# Patient Record
Sex: FEMALE | Race: WHITE | URBAN - METROPOLITAN AREA
[De-identification: names, ages, dates, MRNs, and addresses within clinical notes are randomized per-mention and may not be internally consistent; named-entity substitution may affect disease eponyms.]

---

## 2021-05-25 ENCOUNTER — RECORDS - HEALTHEAST (OUTPATIENT)
Dept: ADMINISTRATIVE | Facility: CLINIC | Age: 71
End: 2021-05-25

## 2024-01-10 ENCOUNTER — TRANSCRIBE ORDERS (OUTPATIENT)
Dept: OTHER | Age: 74
End: 2024-01-10

## 2024-01-10 DIAGNOSIS — R68.89 FORGETFULNESS: Primary | ICD-10-CM

## 2024-01-10 DIAGNOSIS — R41.3 MEMORY LOSS: ICD-10-CM

## 2024-01-16 ENCOUNTER — TELEPHONE (OUTPATIENT)
Dept: NEUROLOGY | Facility: CLINIC | Age: 74
End: 2024-01-16
Payer: COMMERCIAL

## 2024-01-16 ENCOUNTER — TELEPHONE (OUTPATIENT)
Dept: NEUROPSYCHOLOGY | Facility: CLINIC | Age: 74
End: 2024-01-16
Payer: COMMERCIAL

## 2024-01-16 NOTE — TELEPHONE ENCOUNTER
Try contacting  patient to schedule for NGN. Not able to reach patient called 2x no option to LVM, No My chart. 1st attempt made 1/16 np

## 2024-01-16 NOTE — TELEPHONE ENCOUNTER
Unable to LVM due to full VM box, No MyChart, for the patient to call back and schedule the following:    Appointment type: New Neurology  Provider: Any general provider  Return date: First avail  Specialty phone number: [phone]  Additional appointment(s) needed: NA  Additonal Notes: Clinical team reviewed, may be scheduled as new general neurology with any    Agatha Ibarra on 1/16/2024 at 4:31 PM

## 2024-01-16 NOTE — TELEPHONE ENCOUNTER
[facility] Call Center    Phone Message    May a detailed message be left on voicemail: yes     Reason for Call: Other: Patient is calling regarding a referral that was placed. It is listed as neurology. Please call back to schedule after reviewing.     Action Taken: Other: Neuropsych    Travel Screening: Not Applicable